# Patient Record
(demographics unavailable — no encounter records)

---

## 2025-05-20 NOTE — PHYSICAL EXAM
[de-identified] : +cerumen impaction in both ear canals - removed. [Midline] : trachea located in midline position [Normal] : no rashes

## 2025-05-20 NOTE — HISTORY OF PRESENT ILLNESS
[de-identified] : Ms. TAMEZ is a 78-year-old female who presents with cerumen buildup. She recently saw her audiologist, Ophelia Nichols, [facility], for hearing aid maintenance and was found to have impacted cerumen in both ears. She otherwise denies pain or discomfort. [Hearing Loss] : hearing loss

## 2025-05-20 NOTE — CONSULT LETTER
[Dear  ___] : Dear  [unfilled], [Courtesy Letter:] : I had the pleasure of seeing your patient, [unfilled], in my office today. [Please see my note below.] : Please see my note below. [Sincerely,] : Sincerely, [FreeTextEntry2] : [facility] Pires  ([facility]) [FreeTextEntry3] : Sheila Engel, KIM, PAJeremyC Sr. Physician Assistant, Otolaryngology at [facility] Adjunct Clinical Instructor, Department of Physician Assistant Studies, [address] [address]

## 2025-05-20 NOTE — ASSESSMENT
[FreeTextEntry1] : -  Cerumen removed from both ear canals -  Follow up with Audiologist as scheduled -  She will keep her current appointment with me in August.

## 2025-05-20 NOTE — PROCEDURE
[Risk and Benefits Discussed] : The purpose, risks, discomforts, benefits and alternatives of the procedure have been explained to the patient including no treatment. [Cerumen Impaction] : Cerumen Impaction [Same] : same as the Pre Op Dx. [] : Removal of Cerumen [FreeTextEntry6] : After informed verbal consent is obtained, alligator forceps are used to remove cerumen from both ear canals. The patient tolerated the procedure well.